# Patient Record
Sex: MALE | Race: OTHER | HISPANIC OR LATINO | Employment: OTHER | URBAN - METROPOLITAN AREA
[De-identification: names, ages, dates, MRNs, and addresses within clinical notes are randomized per-mention and may not be internally consistent; named-entity substitution may affect disease eponyms.]

---

## 2022-08-13 ENCOUNTER — HOSPITAL ENCOUNTER (EMERGENCY)
Facility: HOSPITAL | Age: 32
Discharge: HOME | End: 2022-08-13

## 2022-08-13 VITALS
RESPIRATION RATE: 18 BRPM | HEART RATE: 88 BPM | SYSTOLIC BLOOD PRESSURE: 118 MMHG | TEMPERATURE: 98 F | DIASTOLIC BLOOD PRESSURE: 75 MMHG | OXYGEN SATURATION: 99 %

## 2022-08-13 DIAGNOSIS — S51.012A ELBOW LACERATION, LEFT, INITIAL ENCOUNTER: Primary | ICD-10-CM

## 2022-08-13 PROCEDURE — 12002 RPR S/N/AX/GEN/TRNK2.6-7.5CM: CPT | Mod: LT

## 2022-08-13 PROCEDURE — 90715 TDAP VACCINE 7 YRS/> IM: CPT

## 2022-08-13 PROCEDURE — 99284 EMERGENCY DEPT VISIT MOD MDM: CPT | Mod: 25

## 2022-08-13 PROCEDURE — 63600175 PHARM REV CODE 636 W HCPCS

## 2022-08-13 PROCEDURE — 25000003 PHARM REV CODE 250

## 2022-08-13 PROCEDURE — 90471 IMMUNIZATION ADMIN: CPT

## 2022-08-13 RX ORDER — LIDOCAINE HYDROCHLORIDE AND EPINEPHRINE 10; 10 MG/ML; UG/ML
10 INJECTION, SOLUTION INFILTRATION; PERINEURAL ONCE
Status: DISCONTINUED | OUTPATIENT
Start: 2022-08-13 | End: 2022-08-13

## 2022-08-13 RX ORDER — LIDOCAINE HYDROCHLORIDE AND EPINEPHRINE 20; 10 MG/ML; UG/ML
10 INJECTION, SOLUTION INFILTRATION; PERINEURAL ONCE
Status: COMPLETED | OUTPATIENT
Start: 2022-08-13 | End: 2022-08-13

## 2022-08-13 RX ADMIN — LIDOCAINE HYDROCHLORIDE AND EPINEPHRINE 10 ML: 20; 10 INJECTION, SOLUTION INFILTRATION; PERINEURAL at 05:08

## 2022-08-13 RX ADMIN — TETANUS TOXOID, REDUCED DIPHTHERIA TOXOID AND ACELLULAR PERTUSSIS VACCINE, ADSORBED 0.5 ML: 5; 2.5; 8; 8; 2.5 SUSPENSION INTRAMUSCULAR at 04:08

## 2022-08-13 NOTE — ED NOTES
Pt provided facility cell phone for family contact and is ambulatory w/ steady gait to WR to await ride home.  Pt aaox4, VSS and as charted (q.v.).
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
## 2022-08-13 NOTE — DISCHARGE INSTRUCTIONS
Sutures need to be removed in 7-10 days.  This can be done at urgent care, primary care office, or return to ED.      COMMUNITY CLINICS     [facility]   [address]   [phone]     Rodri Walker   [address] [facility] [address]   [phone]     Sree Donaldson    [address]   [phone]     Jacob Snowden   [address]   [phone]     [facility]   [address] for HIV  Patients   [phone]     OTHER    [facility]   [address]   [phone]     [facility]   [address]   [phone]     [facility]   [address] [facility] [address]   [phone]     [facility]   [address] [facility]   [phone]     [facility]   [address]  (Mon- Wed       Fri 1-5pm)   [phone]     [facility]    [phone]     [facility]   [address]   [phone]     [facility]   [address] [place] [address]   [phone]     [facility]   [address]   [phone]     MENTAL HEALTH    [place]  [facility] [place]   [address]   [phone]     [facility]   [address]   [phone]     [facility]   [address] [facility]   [phone]     [place] [address]   [phone]     [facility]   [address] [facility]   [phone]     [facility]   [address]   [phone]     [facility]    [address]  [facility] [phone]     [facility]    [phone]         MEDICARE AND MEDICAID SERVICES                                [phone]     [facility] [place] [facility]    [facility] App[facility]ment [facility] All Specialties    [phone]     Medicine Clinic   [address]   [phone]     Dermatology   [address]   [phone]     Ophthalmology Clinic   [address]   [phone]     Primary Care   [address]   [phone]     Infectious  Disease   [address]   [phone]     [facility]   [address] [facility] [address]   [phone]     [facility]    [phone]     [facility]   [address]   [phone]     [facility]   [address] [facility]   [phone] and [phone]